# Patient Record
Sex: MALE | Race: OTHER | NOT HISPANIC OR LATINO | URBAN - METROPOLITAN AREA
[De-identification: names, ages, dates, MRNs, and addresses within clinical notes are randomized per-mention and may not be internally consistent; named-entity substitution may affect disease eponyms.]

---

## 2019-07-23 ENCOUNTER — EMERGENCY (EMERGENCY)
Facility: HOSPITAL | Age: 24
LOS: 1 days | Discharge: HOME | End: 2019-07-23
Attending: EMERGENCY MEDICINE | Admitting: EMERGENCY MEDICINE
Payer: COMMERCIAL

## 2019-07-23 VITALS
SYSTOLIC BLOOD PRESSURE: 130 MMHG | OXYGEN SATURATION: 100 % | RESPIRATION RATE: 17 BRPM | DIASTOLIC BLOOD PRESSURE: 84 MMHG | HEART RATE: 78 BPM

## 2019-07-23 VITALS
WEIGHT: 210.1 LBS | HEART RATE: 86 BPM | SYSTOLIC BLOOD PRESSURE: 133 MMHG | HEIGHT: 71 IN | TEMPERATURE: 98 F | RESPIRATION RATE: 16 BRPM | DIASTOLIC BLOOD PRESSURE: 87 MMHG | OXYGEN SATURATION: 98 %

## 2019-07-23 PROCEDURE — 99283 EMERGENCY DEPT VISIT LOW MDM: CPT

## 2019-07-23 PROCEDURE — 99053 MED SERV 10PM-8AM 24 HR FAC: CPT

## 2019-07-23 PROCEDURE — 73562 X-RAY EXAM OF KNEE 3: CPT | Mod: 26,LT

## 2019-07-23 PROCEDURE — 73562 X-RAY EXAM OF KNEE 3: CPT

## 2019-07-23 RX ORDER — IBUPROFEN 200 MG
800 TABLET ORAL ONCE
Refills: 0 | Status: COMPLETED | OUTPATIENT
Start: 2019-07-23 | End: 2019-07-23

## 2019-07-23 RX ORDER — IBUPROFEN 200 MG
1 TABLET ORAL
Qty: 40 | Refills: 0
Start: 2019-07-23 | End: 2019-08-01

## 2019-07-23 RX ADMIN — Medication 800 MILLIGRAM(S): at 02:47

## 2019-07-23 NOTE — ED PROCEDURE NOTE - CPROC ED POST PROC CARE GUIDE1
Keep the cast/splint/dressing clean and dry./Instructed patient/caregiver to follow-up with primary care physician./Verbal/written post procedure instructions were given to patient/caregiver./Elevate the injured extremity as instructed./Instructed patient/caregiver regarding signs and symptoms of infection.

## 2019-07-23 NOTE — ED PROVIDER NOTE - MUSCULOSKELETAL MINIMAL EXAM
L knee pain with flexion, no effusion palpated, nml extension, no laxity to varus or valgus stress or anterior or posterior drawer test/TORTICOLLIS

## 2019-07-23 NOTE — ED PROVIDER NOTE - NSFOLLOWUPCLINICS_GEN_ALL_ED_FT
[facility]  Orthopedics  [address] [place] [address]  Phone: [phone]  Fax: [phone]  Follow Up Time:

## 2019-07-23 NOTE — ED PROVIDER NOTE - CLINICAL SUMMARY MEDICAL DECISION MAKING FREE TEXT BOX
24 M with L knee pain s/p mechanical fall with direct blow to L knee x today. Xray shows no displaced fracture. Given ibuprofen for pain, place in knee immobilizer and crutches. Will have outpatient MRI scheduled by care coordinator Letty.

## 2019-07-23 NOTE — ED PROVIDER NOTE - CARE PROVIDER_API CALL
Bill Huffman (MD)  Orthopaedic Surgery; Sports Medicine  [address] [place] [address]  Phone: [phone]  Fax: [phone]  Follow Up Time:

## 2019-07-23 NOTE — ED PROVIDER NOTE - OBJECTIVE STATEMENT
25 y/o male with no significant PMHx presents to the ED c/o L knee pain x today. Pt notes he works as a  and while at work today at 12:30AM, pt sustained a trip and fall, landing on his L knee. Pt notes he felt and heard a pop upon impact. Pt denies numbness, tingling, or any other complaints. NKDA.

## 2019-07-23 NOTE — ED PROVIDER NOTE - NSFOLLOWUPINSTRUCTIONS_ED_ALL_ED_FT
Continue ibuprofen 800mg every 6 hours as needed for pain.  Keep knee in immobilizer and use crutches to walk.  Followup with orthopedist above for reevaluation-call office for appointment.  You will be called to schedule MRI of the knee later this week.

## 2019-07-23 NOTE — ED ADULT NURSE NOTE - OBJECTIVE STATEMENT
Pt was BIB EMS reporting fall at work resulting in left knee pain and difficulty ambulating or bending knee. AxO3. Pt denied head injury, SOB, chest pain, difficulty breathing, N/V/D, fever. All fall precautions in place.

## 2019-07-23 NOTE — ED ADULT NURSE NOTE - NSIMPLEMENTINTERV_GEN_ALL_ED
Implemented All Fall Risk Interventions:  [place] to call system. Call bell, personal items and telephone within reach. Instruct patient to call for assistance. Room bathroom lighting operational. Non-slip footwear when patient is off stretcher. Physically safe environment: no spills, clutter or unnecessary equipment. Stretcher in lowest position, wheels locked, appropriate side rails in place. Provide visual cue, wrist band, yellow gown, etc. Monitor gait and stability. Monitor for mental status changes and reorient to person, place, and time. Review medications for side effects contributing to fall risk. Reinforce activity limits and safety measures with patient and family.

## 2019-08-02 PROBLEM — Z78.9 OTHER SPECIFIED HEALTH STATUS: Chronic | Status: ACTIVE | Noted: 2019-07-23

## 2019-08-06 PROBLEM — Z00.00 ENCOUNTER FOR PREVENTIVE HEALTH EXAMINATION: Status: ACTIVE | Noted: 2019-08-06

## 2019-08-16 ENCOUNTER — TRANSCRIPTION ENCOUNTER (OUTPATIENT)
Age: 24
End: 2019-08-16

## 2019-08-16 ENCOUNTER — APPOINTMENT (OUTPATIENT)
Age: 24
End: 2019-08-16
Payer: OTHER MISCELLANEOUS

## 2019-08-16 VITALS — WEIGHT: 210 LBS | HEIGHT: 70 IN | BODY MASS INDEX: 30.06 KG/M2

## 2019-08-16 PROCEDURE — 73564 X-RAY EXAM KNEE 4 OR MORE: CPT | Mod: LT

## 2019-08-16 PROCEDURE — 99455 WORK RELATED DISABILITY EXAM: CPT

## 2019-08-16 RX ORDER — DICLOFENAC SODIUM 50 MG/1
50 TABLET, DELAYED RELEASE ORAL
Qty: 60 | Refills: 1 | Status: ACTIVE | COMMUNITY
Start: 2019-08-16 | End: 1900-01-01

## 2019-09-20 NOTE — HISTORY OF PRESENT ILLNESS
[de-identified] : CC Left knee\par \par HPI YULY, 24 year old M RHD presents with acute onset of 3 weeks (July 23, 2019) of Intermittent w Activity Related pain in  the Medial left knee with injury. Patient was at work, as a , and fell, twisting his left knee.  Patient did go to [facility] ([place]), x-rays were taken and told negative.  The pain is better and rated a 5 out of 10, described as Sharp, without radiation. Rest makes the pain better and walking standing stairs, any activity bending the left knee makes the pain worse. The patient reports associated symptoms of Pop/Swelling/Weakess/Instability. The patient Denies pain at night affecting sleep, and denies] similar pain previously.\par \par The patient has tried the following treatments:\par Activity modification	+\par Ice/Compression  	               +\par Braces    		               + (crutches too)\par Nsaids    		               +\par Physical Therapy  	               -\par Cortisone Injection	               -\par Visco Injection		-\par Arthroscopy		-\par \par Review of Systems is positive for the above musculoskeletal symptoms and is otherwise non-contributory for general, constitutional, psychiatric, neurologic, HEENT, cardiac, respiratory, gastrointestinal, reproductive, lymphatic, and dermatologic complaints.\par \par Consult by [facility].\par

## 2019-09-20 NOTE — DISCUSSION/SUMMARY
[de-identified] : Left knee internal derangement and traumatic patellofemoral syndrome\par \par \par I discussed my findings on history, exam and radiology.\par \par I reviewed the anatomy and function of the periarticular muscles and tendons, patella, ligaments, menisci and cartilage of the knee using models, images and diagrams. Given the current findings for the patient, I recommend proceeding with advanced imaging to better characterize and diagnose the problem to aid patient care, management and treatment guidance as I suspect an internal injury to the knee not diagnosed on non-diagnostic plain radiographs causing the patients symptoms and physical examination to help provide surgical management planning.\par \par Physical therapy\par \par Therefore given the patients continued symptoms despite conservative managements with continued pain affecting ADLs and inability to do activities limiting quality of life as well as locking and instability significant for patient falls potentially placing the patient at increased risk for exacerbating the injury or causing further injury to the knee, an examination and history consistent with injury to an internal knee derangement and a non-diagnostic radiograph I recommend obtaining an MRI to assess the knee's internal structures for preoperative planning. I discussed with the patient that I am ordering an MRI to assess the soft tissue structures in the joint such as ligaments and tendons, as well as to assess the cartilage and menisci as well as for any bony edema. The test will need insurance approval and will take place at a [facility] or outside facility. If the patient elects to obtain the MRI from an outside facility, the patient understands they have been instructed to bring in both the final radiologist read and a CD/DVD with the MRI images to allow review of the imaging test by myself during the follow up visit. I do not recommend obtaining an open standing MRI as the quality of the images is subpar and makes it difficult to diagnose intra-articular derangements and guide care discussion and decision making.\par The patient was prescribed Diclofenac PO non-steroidal anti-inflammatory medication. 50mg tablets twice daily to be taken for at least 1-2 weeks in a row and then PRN afterwards. Risks and benefits were discussed and include but not limited to renal damage and GI ulceration and bleeding.  They were advised to take with food to limit stomach upset as well as warned to stop the medication if worsening gastric pain or dizziness or other side effects. Also to immediately stop the medication and seek appropriate medical attention if any severe stomach ache, gastritis, black/red vomit, black/red stools or any other medical concern.\par \par The patient verifies their understanding the the visit, diagnosis and plan. They agree with the treatment plan and will contact the office with any questions or problems.\par \par FU after MRI is obtained

## 2019-09-20 NOTE — PHYSICAL EXAM
[Not Fit For Work] : Not fit for work [Percentage Of Disability ( ___ % )] : currently ~he/she~ is at [unfilled]% disability [de-identified] : Physical Examination\par General: well nourished, in no acute distress, alert and oriented to person, place and time\par Psychiatric: normal mood and affect, no abnormal movements or speech patterns\par Eyes: vision intact - glasses\par Throat: no thyromegaly\par Lymph: no enlarged nodes, no lymphedema in extremity\par Respiratory: no wheezing, no shortness of breath with ambulation\par Cardiac: no cardiac leg swelling, 2+ peripheral pulses\par Neurology: normal gross sensation in extremities to light touch\par Abdomen: soft, non-tender, tympanic, no masses\par \par Musculoskeletal Examination\par Ambulation	+ antalgic gait, - assistive devices\par \par Knee			Right			Left\par General\par      Swelling/Deformity	normal			normal	\par      Skin			normal			normal\par      Erythema		-			-\par      Standing Alignment	neutral			neutral\par      Effusion		none			trace\par Range of Motion\par      Hip			full painless ROM		full painless ROM\par      Knee Flexion		130			120+\par      Knee Extension	0			0\par Patella\par      J Sign		-			-\par      Quad Medial/Lateral	1/1 1/1\par      Apprehension		-			-\par      Dennis's		-			+\par      Grind Sign		-			+\par      Crepitus		-			+\par Palpation\par      Medial Joint Line	-			+\par      Medial Fem Condyle	-			-\par      Lateral Joint Line	-			-\par      Quad Tendon		-			-\par      Patella Tendon	-			-\par      Medial Patella		-			-\par      Lateral Patella 	-			-\par      Posterior Knee	-			-\par Ligamentous\par      Varus @ 0° / 30°	-/-			-/-\par      Valgus @ 0° / 30°	-/-			-/-\par      Lachman		-			-\par      Pivot Shift		-			-\par      Anterior Drawer	-			-\par      Posterior Drawer	-			-\par Meniscus\par      Samantha		-			+ medial pain\par      Flexion Pinch		-			+ medial pain\par Strength Examination/Atrophy\par      Hip Flexors 		5+			5+\par      Quadriceps		5+			5+\par      Hamstring		5+			5+\par      Tibialis Anterior	5+			5+\par      Achilles/Soleus	5+			5+\par Sensation\par      Deep Peroneal	normal			normal\par      Superficial Peroneal 	normal			normal\par      Sural  		normal			normal\par      Posterior Tibial 	normal			normal\par      Saphneous 		normal			normal\par Pulses\par      DP			2+			2+\par  [de-identified] : 5 views of the affected Left knee (standing AP, flexing standing AP, 30degree flexed lateral, 0degree lateral, sunrise view)\par were ordered, obtained and evaluated by myself today and\par demonstrate:\par There is no narrowing\par no osteophytic lipping\par no suprapatellar effusion\par no patellofemoral joint space loss without evidence of tilt [or] subluxation on sunrise view\par Normal soft tissue density\par Otherwise normal osseous bone structure without fracture or dislocation [Physical Disability Temporary Total] : temporarily total disabled [FreeTextEntry4] : pending MRI

## 2019-09-27 ENCOUNTER — APPOINTMENT (OUTPATIENT)
Dept: ORTHOPEDIC SURGERY | Facility: CLINIC | Age: 24
End: 2019-09-27
Payer: OTHER MISCELLANEOUS

## 2019-09-27 PROCEDURE — 99455 WORK RELATED DISABILITY EXAM: CPT

## 2019-09-27 NOTE — DISCUSSION/SUMMARY
[de-identified] : Left knee ACL rupture and lateral meniscus flipped bucket handle tear and traumatic patellofemoral syndrome\par \par I reviewed my findings on history, exam and radiology.\par \par I reviewed the anatomy and function of the anterior cruciate ligament with models, pictures and diagrams as well as the injury mechanism and expected symptoms post injury. I discussed the injury management both operative and non-operative including physical therapy, bracing, non-steroidal anti-inflammatories, activity modification, rest, ice, compression, activity modifaction and patient education. I reviewed the long term expectations and sequelae of the injury with both operative and non-operative management. The patient's current activity level and future expected activity level and lifestyle expectations were reviewed in relation to expected impairment with operative versus non-operative management.\par \par Given the above discussion the patient wishes to discuss options with .\par \par Will proceed with a course of PT to maintin ROM and strength in preparation if patient proceeds with surgery.\par \par The patient was prescribed Diclofenac PO non-steroidal anti-inflammatory medication. 50mg tablets twice daily to be taken for at least 1-2 weeks in a row and then PRN afterwards. Risks and benefits were discussed and include but not limited to renal damage and GI ulceration and bleeding.  They were advised to take with food to limit stomach upset as well as warned to stop the medication if worsening gastric pain or dizziness or other side effects. Also to immediately stop the medication and seek appriate medical attention if any severe stomach ache, gastritis, black/red vomit, black/red stools or any other medical concern.\par \par ice and elevate\par \par Compressive ace wrap on leg\par \par NWB LLE w crutches\par \par patient is total temorary disabled due to work place injury\par \par The patient verifies their understanding the the visit, diagnosis and plan. They agree with the treatment plan and will contact the office with any questions or problems.\par \par FU 2-3 weeks

## 2019-09-27 NOTE — HISTORY OF PRESENT ILLNESS
[de-identified] : CC Left knee\par \par RENATA EMERY, 24 year old M RHD presents for mri review of acute onset of 3 weeks (July 23, 2019) of Intermittent w Activity Related pain in the Medial left knee with injury. Patient was at work, as a , and fell, twisting his left knee. Patient did go to [facility] ([place]), x-rays were taken and told negative. The pain is better and rated a 5 out of 10, described as Sharp, without radiation. Rest makes the pain better and walking standing stairs, any activity bending the left knee makes the pain worse. The patient reports associated symptoms of Pop/Swelling/Weakess/Instability. The patient Denies pain at night affecting sleep, and denies] similar pain previously.\par \par The patient has tried the following treatments:\par Activity modification	+\par Ice/Compression 	 +\par Braces 		 + (crutches too)\par Nsaids 		 +\par Physical Therapy 	 -\par Cortisone Injection	 -\par Visco Injection		-\par Arthroscopy		-\par \par Review of Systems is positive for the above musculoskeletal symptoms and is otherwise non-contributory for general, constitutional, psychiatric, neurologic, HEENT, cardiac, respiratory, gastrointestinal, reproductive, lymphatic, and dermatologic complaints.\par \par Consult by [facility].\par

## 2019-09-27 NOTE — PHYSICAL EXAM
[de-identified] : Physical Examination\par General: well nourished, in no acute distress, alert and oriented to person, place and time\par Psychiatric: normal mood and affect, no abnormal movements or speech patterns\par Eyes: vision intact - glasses\par Throat: no thyromegaly\par Lymph: no enlarged nodes, no lymphedema in extremity\par Respiratory: no wheezing, no shortness of breath with ambulation\par Cardiac: no cardiac leg swelling, 2+ peripheral pulses\par Neurology: normal gross sensation in extremities to light touch\par Abdomen: soft, non-tender, tympanic, no masses\par \par Musculoskeletal Examination\par Ambulation	+ antalgic gait, - assistive devices\par \par Knee			Right			Left\par General\par      Swelling/Deformity	normal			normal	\par      Skin			normal			normal\par      Erythema		-			-\par      Standing Alignment	neutral			neutral\par      Effusion		none			trace\par Range of Motion\par      Hip			full painless ROM		full painless ROM\par      Knee Flexion		130			120+\par      Knee Extension	0			0\par Patella\par      J Sign		-			-\par      Quad Medial/Lateral	1/1 1/1\par      Apprehension		-			-\par      Dennis's		-			+\par      Grind Sign		-			+\par      Crepitus		-			+\par Palpation\par      Medial Joint Line	-			+\par      Medial Fem Condyle	-			-\par      Lateral Joint Line	-			-\par      Quad Tendon		-			-\par      Patella Tendon	-			-\par      Medial Patella		-			-\par      Lateral Patella 	-			-\par      Posterior Knee	-			-\par Ligamentous\par      Varus @ 0° / 30°	-/-			-/-\par      Valgus @ 0° / 30°	-/-			-/-\par      Lachman		-			2B\par      Pivot Shift		-			limited exam\par      Anterior Drawer	-			-\par      Posterior Drawer	-			-\par Meniscus\par      Samantha		-			+ pain\par      Flexion Pinch		-			+ pain\par Strength Examination/Atrophy\par      Hip Flexors 		5+			5+\par      Quadriceps		5+			5+\par      Hamstring		5+			5+\par      Tibialis Anterior	5+			5+\par      Achilles/Soleus	5+			5+\par Sensation\par      Deep Peroneal	normal			normal\par      Superficial Peroneal 	normal			normal\par      Sural  		normal			normal\par      Posterior Tibial 	normal			normal\par      Saphneous 		normal			normal\par Pulses\par      DP			2+			2+\par  [de-identified] : 5 views of the affected Left knee (standing AP, flexing standing AP, 30degree flexed lateral, 0degree lateral, sunrise view)\par 8-16-19\par demonstrate:\par There is no narrowing\par no osteophytic lipping\par no suprapatellar effusion\par no patellofemoral joint space loss without evidence of tilt [or] subluxation on sunrise view\par Normal soft tissue density\par Otherwise normal osseous bone structure without fracture or dislocation\par \par \par MRI Left knee from [facility] on 9-13-19\par My impression of the images:\par Quality of the MRI is ok\par Medial Meniscus ok\par Lateral Meniscus possible flipped bucket handle tear\par There is mild chondral loss in the patellofemoral compartments\par There is bone marrow edema in the lateral compartments bone bruise acl pattern\par LCL is intact\par MCL is intact\par ACL is not intact  torn\par PCL is intact \par Quadriceps Tendon  is  intact\par Patella Tendon is intact\par \par The Final Radiologist Impression:\par Joint effusion.\par Lateral patellar tilt, without subluxation. Vaguely defined marrow edema posterior margins medial and lateral tibial plateau, as well as along the anterior margin of the medial tibial plateau. No visualized fracture line. No marrow infiltrative process.\par Full-thickness tear anterior cruciate ligament, with abnormal horizontal orientation. Distal quadriceps tendon, patellar tendon, PCL, popliteus, medial collateral ligament, lateral collateral ligament, iliotibial band and biceps femoris intact.\par No evidence of medial meniscal tear. Truncation lateral meniscal body with a question of a displaced bucket-handle fragment in the intercondylar region versus a prominent meniscal-meniscal ligament.\par No soft tissue mass.\par IMPRESSION:\par 1. Full-thickness tear anterior cruciate ligament.\par 2. Lateral meniscal tear with question of nondisplaced intercondylar bucket handle fragment, as above.\par 3. Residual bone contusions.\par 4. Joint effusion.\par  [Percentage Of Disability ( ___ % )] : currently ~he/she~ is at [unfilled]% disability [Not Fit For Work] : Not fit for work [Physical Disability Temporary Total] : temporarily total disabled

## 2019-10-11 ENCOUNTER — APPOINTMENT (OUTPATIENT)
Dept: ORTHOPEDIC SURGERY | Facility: CLINIC | Age: 24
End: 2019-10-11
Payer: OTHER MISCELLANEOUS

## 2019-10-11 PROCEDURE — 99214 OFFICE O/P EST MOD 30 MIN: CPT

## 2019-10-11 NOTE — DISCUSSION/SUMMARY
[de-identified] : Left knee ACL rupture and lateral meniscus flipped bucket handle tear and traumatic patellofemoral syndrome\par \par I reviewed my findings on history, exam and radiology.\par \par I reviewed the anatomy and function of the anterior cruciate ligament with models, pictures and diagrams as well as the injury mechanism and expected symptoms post injury. I discussed the injury management both operative and non-operative including physical therapy, bracing, non-steroidal anti-inflammatories, activity modification, rest, ice, compression, activity modifaction and patient education. I reviewed the long term expectations and sequelae of the injury with both operative and non-operative management. The patient's current activity level and future expected activity level and lifestyle expectations were reviewed in relation to expected impairment with operative versus non-operative management.\par \par \par Will continue PT to maintain ROM and strength in preparation\par \par patient wishes to proceed to surgery. will get work comp authorization for ACL recon and lateral meniscus repair vs debridementr\par \par continue prescribed Diclofenac PO non-steroidal anti-inflammatory medication. 50mg tablets twice daily to be taken for at least 1-2 weeks in a row and then PRN afterwards. Risks and benefits were discussed and include but not limited to renal damage and GI ulceration and bleeding.  They were advised to take with food to limit stomach upset as well as warned to stop the medication if worsening gastric pain or dizziness or other side effects. Also to immediately stop the medication and seek appriate medical attention if any severe stomach ache, gastritis, black/red vomit, black/red stools or any other medical concern.\par \par ice and elevate\par \par Compressive ace wrap on leg\par \par NWB LLE w crutches\par \par patient is total temporary disabled due to work place injury\par \par The patient verifies their understanding the the visit, diagnosis and plan. They agree with the treatment plan and will contact the office with any questions or problems.\par \par FU 4-6 weeks if auth still pending

## 2019-10-11 NOTE — PHYSICAL EXAM
[de-identified] : Physical Examination\par General: well nourished, in no acute distress, alert and oriented to person, place and time\par Psychiatric: normal mood and affect, no abnormal movements or speech patterns\par Eyes: vision intact - glasses\par Throat: no thyromegaly\par Lymph: no enlarged nodes, no lymphedema in extremity\par Respiratory: no wheezing, no shortness of breath with ambulation\par Cardiac: no cardiac leg swelling, 2+ peripheral pulses\par Neurology: normal gross sensation in extremities to light touch\par Abdomen: soft, non-tender, tympanic, no masses\par \par Musculoskeletal Examination\par Ambulation	+ antalgic gait, - assistive devices\par \par Knee			Right			Left\par General\par      Swelling/Deformity	normal			normal	\par      Skin			normal			normal\par      Erythema		-			-\par      Standing Alignment	neutral			neutral\par      Effusion		none			trace\par Range of Motion\par      Hip			full painless ROM		full painless ROM\par      Knee Flexion		130			130\par      Knee Extension	0			0\par Patella\par      J Sign		-			-\par      Quad Medial/Lateral	1/1 1/1\par      Apprehension		-			-\par      Dennis's		-			+\par      Grind Sign		-			+\par      Crepitus		-			+\par Palpation\par      Medial Joint Line	-			-\par      Medial Fem Condyle	-			-\par      Lateral Joint Line	-			+\par      Quad Tendon		-			-\par      Patella Tendon	-			-\par      Medial Patella		-			-\par      Lateral Patella 	-			-\par      Posterior Knee	-			-\par Ligamentous\par      Varus @ 0° / 30°	-/-			-/-\par      Valgus @ 0° / 30°	-/-			-/-\par      Lachman		-			2B\par      Pivot Shift		-			limited exam\par      Anterior Drawer	-			-\par      Posterior Drawer	-			-\par Meniscus\par      Samantha		-			+ pain\par      Flexion Pinch		-			+ pain\par Strength Examination/Atrophy\par      Hip Flexors 		5+			5+\par      Quadriceps		5+			5+\par      Hamstring		5+			5+\par      Tibialis Anterior	5+			5+\par      Achilles/Soleus	5+			5+\par Sensation\par      Deep Peroneal	normal			normal\par      Superficial Peroneal 	normal			normal\par      Sural  		normal			normal\par      Posterior Tibial 	normal			normal\par      Saphneous 		normal			normal\par Pulses\par      DP			2+			2+\par  [de-identified] : 5 views of the affected Left knee (standing AP, flexing standing AP, 30degree flexed lateral, 0degree lateral, sunrise view)\par 8-16-19\par demonstrate:\par There is no narrowing\par no osteophytic lipping\par no suprapatellar effusion\par no patellofemoral joint space loss without evidence of tilt [or] subluxation on sunrise view\par Normal soft tissue density\par Otherwise normal osseous bone structure without fracture or dislocation\par \par \par MRI Left knee from [facility] on 9-13-19\par My impression of the images:\par Quality of the MRI is ok\par Medial Meniscus ok\par Lateral Meniscus possible flipped bucket handle tear\par There is mild chondral loss in the patellofemoral compartments\par There is bone marrow edema in the lateral compartments bone bruise acl pattern\par LCL is intact\par MCL is intact\par ACL is not intact  torn\par PCL is intact \par Quadriceps Tendon  is  intact\par Patella Tendon is intact\par \par The Final Radiologist Impression:\par Joint effusion.\par Lateral patellar tilt, without subluxation. Vaguely defined marrow edema posterior margins medial and lateral tibial plateau, as well as along the anterior margin of the medial tibial plateau. No visualized fracture line. No marrow infiltrative process.\par Full-thickness tear anterior cruciate ligament, with abnormal horizontal orientation. Distal quadriceps tendon, patellar tendon, PCL, popliteus, medial collateral ligament, lateral collateral ligament, iliotibial band and biceps femoris intact.\par No evidence of medial meniscal tear. Truncation lateral meniscal body with a question of a displaced bucket-handle fragment in the intercondylar region versus a prominent meniscal-meniscal ligament.\par No soft tissue mass.\par \par IMPRESSION:\par 1. Full-thickness tear anterior cruciate ligament.\par 2. Lateral meniscal tear with question of nondisplaced intercondylar bucket handle fragment, as above.\par 3. Residual bone contusions.\par 4. Joint effusion.\par  [Not Fit For Work] : Not fit for work [Percentage Of Disability ( ___ % )] : currently ~he/she~ is at [unfilled]% disability [Physical Disability Temporary Total] : temporarily total disabled

## 2019-10-11 NOTE — HISTORY OF PRESENT ILLNESS
[de-identified] : CC Left knee\par \par HPI YULY, 24 year old M RHD presents for FU discussion of MRI review of acute onset of 3 weeks (July 23, 2019) of Intermittent w Activity Related pain in the Medial left knee with injury. Patient was at work, as a , and fell, twisting his left knee. Patient did go to [facility] ([place]), x-rays were taken and told negative. The pain is better and rated a 5 out of 10, described as Sharp, without radiation. Rest makes the pain better and walking standing stairs, any activity bending the left knee makes the pain worse. The patient reports associated symptoms of Pop/Swelling/Weakess/Instability. The patient Denies pain at night affecting sleep, and denies] similar pain previously.\par \par The patient has tried the following treatments:\par Activity modification	+\par Ice/Compression 	 +\par Braces 		 + (crutches too)\par Nsaids 		 +\par Physical Therapy 	 +\par Cortisone Injection	 -\par Visco Injection		-\par Arthroscopy		-\par \par pain improving w PT but still feels unstable\par \par Review of Systems is positive for the above musculoskeletal symptoms and is otherwise non-contributory for general, constitutional, psychiatric, neurologic, HEENT, cardiac, respiratory, gastrointestinal, reproductive, lymphatic, and dermatologic complaints.\par \par Consult by [facility].\par

## 2019-12-24 ENCOUNTER — OUTPATIENT (OUTPATIENT)
Dept: OUTPATIENT SERVICES | Facility: HOSPITAL | Age: 24
LOS: 1 days | End: 2019-12-24
Payer: COMMERCIAL

## 2019-12-24 VITALS
TEMPERATURE: 99 F | WEIGHT: 214.95 LBS | SYSTOLIC BLOOD PRESSURE: 122 MMHG | DIASTOLIC BLOOD PRESSURE: 85 MMHG | HEART RATE: 85 BPM | OXYGEN SATURATION: 99 % | RESPIRATION RATE: 16 BRPM | HEIGHT: 71 IN

## 2019-12-24 DIAGNOSIS — Z01.818 ENCOUNTER FOR OTHER PREPROCEDURAL EXAMINATION: ICD-10-CM

## 2019-12-24 DIAGNOSIS — S83.512A SPRAIN OF ANTERIOR CRUCIATE LIGAMENT OF LEFT KNEE, INITIAL ENCOUNTER: ICD-10-CM

## 2019-12-24 DIAGNOSIS — S83.252A BUCKET-HANDLE TEAR OF LATERAL MENISCUS, CURRENT INJURY, LEFT KNEE, INITIAL ENCOUNTER: ICD-10-CM

## 2019-12-24 LAB
ANION GAP SERPL CALC-SCNC: 8 MMOL/L — SIGNIFICANT CHANGE UP (ref 5–17)
BUN SERPL-MCNC: 15 MG/DL — SIGNIFICANT CHANGE UP (ref 7–18)
CALCIUM SERPL-MCNC: 9.5 MG/DL — SIGNIFICANT CHANGE UP (ref 8.4–10.5)
CHLORIDE SERPL-SCNC: 105 MMOL/L — SIGNIFICANT CHANGE UP (ref 96–108)
CO2 SERPL-SCNC: 27 MMOL/L — SIGNIFICANT CHANGE UP (ref 22–31)
CREAT SERPL-MCNC: 0.97 MG/DL — SIGNIFICANT CHANGE UP (ref 0.5–1.3)
GLUCOSE SERPL-MCNC: 111 MG/DL — HIGH (ref 70–99)
HCT VFR BLD CALC: 47.4 % — SIGNIFICANT CHANGE UP (ref 39–50)
HGB BLD-MCNC: 15.3 G/DL — SIGNIFICANT CHANGE UP (ref 13–17)
MCHC RBC-ENTMCNC: 27.6 PG — SIGNIFICANT CHANGE UP (ref 27–34)
MCHC RBC-ENTMCNC: 32.3 GM/DL — SIGNIFICANT CHANGE UP (ref 32–36)
MCV RBC AUTO: 85.4 FL — SIGNIFICANT CHANGE UP (ref 80–100)
NRBC # BLD: 0 /100 WBCS — SIGNIFICANT CHANGE UP (ref 0–0)
PLATELET # BLD AUTO: 268 K/UL — SIGNIFICANT CHANGE UP (ref 150–400)
POTASSIUM SERPL-MCNC: 4.1 MMOL/L — SIGNIFICANT CHANGE UP (ref 3.5–5.3)
POTASSIUM SERPL-SCNC: 4.1 MMOL/L — SIGNIFICANT CHANGE UP (ref 3.5–5.3)
RBC # BLD: 5.55 M/UL — SIGNIFICANT CHANGE UP (ref 4.2–5.8)
RBC # FLD: 12.1 % — SIGNIFICANT CHANGE UP (ref 10.3–14.5)
SODIUM SERPL-SCNC: 140 MMOL/L — SIGNIFICANT CHANGE UP (ref 135–145)
WBC # BLD: 6.11 K/UL — SIGNIFICANT CHANGE UP (ref 3.8–10.5)
WBC # FLD AUTO: 6.11 K/UL — SIGNIFICANT CHANGE UP (ref 3.8–10.5)

## 2019-12-24 PROCEDURE — 80048 BASIC METABOLIC PNL TOTAL CA: CPT

## 2019-12-24 PROCEDURE — 36415 COLL VENOUS BLD VENIPUNCTURE: CPT

## 2019-12-24 PROCEDURE — 85027 COMPLETE CBC AUTOMATED: CPT

## 2019-12-24 PROCEDURE — G0463: CPT

## 2019-12-24 RX ORDER — SODIUM CHLORIDE 9 MG/ML
3 INJECTION INTRAMUSCULAR; INTRAVENOUS; SUBCUTANEOUS EVERY 8 HOURS
Refills: 0 | Status: DISCONTINUED | OUTPATIENT
Start: 2020-01-02 | End: 2020-01-11

## 2019-12-24 NOTE — H&P PST ADULT - ASSESSMENT
25 yo male is scheduled for :  Left Knee Arthroscopy  Anterior Cruciate Ligament Reconstruction with Hamstring Autograft  Lateral Meniscus Repair  Possible Debridement, on 1/2/20

## 2019-12-24 NOTE — H&P PST ADULT - HISTORY OF PRESENT ILLNESS
25 yo male with no significant PMHx, reports the above. He is scheduled for Left Knee Arthroscopy  Anterior Cruciate Ligament Reconstruction with Hamstring Autograft  Lateral Meniscus Repair  Possible Debridement, on 1/2/20

## 2019-12-24 NOTE — H&P PST ADULT - NEGATIVE ALLERGY TYPES
no reactions to animals/no reactions to insect bites/no reactions to medicines/no reactions to food/no outdoor environmental allergies/no indoor environmental allergies

## 2019-12-24 NOTE — H&P PST ADULT - REASON FOR ADMISSION
Injured my left knee, at work in July/2019. Has not been able to stand on feet for more th20-30 minutes

## 2019-12-24 NOTE — H&P PST ADULT - NSICDXPROBLEM_GEN_ALL_CORE_FT
PROBLEM DIAGNOSES  Problem: Bucket-handle tear of lateral meniscus, current injury, left knee, initial encounter  Assessment and Plan: Left Knee Arthroscopy  Anterior Cruciate Ligament Reconstruction with Hamstring Autograft  Lateral Meniscus Repair  Possible Debridement    Problem: Sprain of anterior cruciate ligament of left knee, initial encounter  Assessment and Plan: Left Knee Arthroscopy  Anterior Cruciate Ligament Reconstruction with Hamstring Autograft  Lateral Meniscus Repair  Possible Debridement

## 2019-12-24 NOTE — H&P PST ADULT - NSANTHOSAYNRD_GEN_A_CORE
No. MARITZA screening performed.  STOP BANG Legend: 0-2 = LOW Risk; 3-4 = INTERMEDIATE Risk; 5-8 = HIGH Risk

## 2020-01-01 ENCOUNTER — TRANSCRIPTION ENCOUNTER (OUTPATIENT)
Age: 25
End: 2020-01-01

## 2020-01-02 ENCOUNTER — OUTPATIENT (OUTPATIENT)
Dept: OUTPATIENT SERVICES | Facility: HOSPITAL | Age: 25
LOS: 1 days | End: 2020-01-02
Payer: COMMERCIAL

## 2020-01-02 ENCOUNTER — APPOINTMENT (OUTPATIENT)
Dept: ORTHOPEDIC SURGERY | Facility: HOSPITAL | Age: 25
End: 2020-01-02

## 2020-01-02 VITALS
TEMPERATURE: 98 F | SYSTOLIC BLOOD PRESSURE: 124 MMHG | DIASTOLIC BLOOD PRESSURE: 84 MMHG | HEART RATE: 73 BPM | RESPIRATION RATE: 18 BRPM | OXYGEN SATURATION: 100 %

## 2020-01-02 VITALS
WEIGHT: 214.95 LBS | OXYGEN SATURATION: 99 % | TEMPERATURE: 102 F | RESPIRATION RATE: 16 BRPM | SYSTOLIC BLOOD PRESSURE: 124 MMHG | HEART RATE: 124 BPM | HEIGHT: 71 IN | DIASTOLIC BLOOD PRESSURE: 88 MMHG

## 2020-01-02 DIAGNOSIS — S83.512A SPRAIN OF ANTERIOR CRUCIATE LIGAMENT OF LEFT KNEE, INITIAL ENCOUNTER: ICD-10-CM

## 2020-01-02 DIAGNOSIS — S83.252A BUCKET-HANDLE TEAR OF LATERAL MENISCUS, CURRENT INJURY, LEFT KNEE, INITIAL ENCOUNTER: ICD-10-CM

## 2020-01-02 PROCEDURE — C1889: CPT

## 2020-01-02 PROCEDURE — 29888 ARTHRS AID ACL RPR/AGMNTJ: CPT | Mod: LT

## 2020-01-02 PROCEDURE — C1713: CPT

## 2020-01-02 PROCEDURE — 29882 ARTHRS KNE SRG MNISC RPR M/L: CPT | Mod: LT

## 2020-01-02 PROCEDURE — C1769: CPT

## 2020-01-02 RX ORDER — ONDANSETRON 8 MG/1
4 TABLET, FILM COATED ORAL EVERY 6 HOURS
Refills: 0 | Status: DISCONTINUED | OUTPATIENT
Start: 2020-01-02 | End: 2020-01-11

## 2020-01-02 RX ORDER — CEPHALEXIN 500 MG
1 CAPSULE ORAL
Qty: 9 | Refills: 0
Start: 2020-01-02 | End: 2020-01-04

## 2020-01-02 RX ORDER — OXYCODONE AND ACETAMINOPHEN 5; 325 MG/1; MG/1
1 TABLET ORAL EVERY 4 HOURS
Refills: 0 | Status: DISCONTINUED | OUTPATIENT
Start: 2020-01-02 | End: 2020-01-02

## 2020-01-02 RX ORDER — MORPHINE SULFATE 50 MG/1
4 CAPSULE, EXTENDED RELEASE ORAL
Refills: 0 | Status: DISCONTINUED | OUTPATIENT
Start: 2020-01-02 | End: 2020-01-02

## 2020-01-02 RX ORDER — CELECOXIB 200 MG/1
200 CAPSULE ORAL ONCE
Refills: 0 | Status: COMPLETED | OUTPATIENT
Start: 2020-01-02 | End: 2020-01-02

## 2020-01-02 RX ORDER — ASPIRIN/CALCIUM CARB/MAGNESIUM 324 MG
1 TABLET ORAL
Qty: 30 | Refills: 0
Start: 2020-01-02 | End: 2020-01-31

## 2020-01-02 RX ORDER — ACETAMINOPHEN 500 MG
1000 TABLET ORAL ONCE
Refills: 0 | Status: DISCONTINUED | OUTPATIENT
Start: 2020-01-02 | End: 2020-01-02

## 2020-01-02 RX ORDER — OXYCODONE AND ACETAMINOPHEN 5; 325 MG/1; MG/1
2 TABLET ORAL EVERY 6 HOURS
Refills: 0 | Status: DISCONTINUED | OUTPATIENT
Start: 2020-01-02 | End: 2020-01-02

## 2020-01-02 RX ORDER — ACETAMINOPHEN 500 MG
975 TABLET ORAL ONCE
Refills: 0 | Status: COMPLETED | OUTPATIENT
Start: 2020-01-02 | End: 2020-01-02

## 2020-01-02 RX ORDER — SODIUM CHLORIDE 9 MG/ML
1000 INJECTION, SOLUTION INTRAVENOUS
Refills: 0 | Status: DISCONTINUED | OUTPATIENT
Start: 2020-01-02 | End: 2020-01-02

## 2020-01-02 RX ADMIN — Medication 975 MILLIGRAM(S): at 08:50

## 2020-01-02 RX ADMIN — CELECOXIB 200 MILLIGRAM(S): 200 CAPSULE ORAL at 08:51

## 2020-01-02 RX ADMIN — SODIUM CHLORIDE 3 MILLILITER(S): 9 INJECTION INTRAMUSCULAR; INTRAVENOUS; SUBCUTANEOUS at 08:54

## 2020-01-02 NOTE — ASU DISCHARGE PLAN (ADULT/PEDIATRIC) - CARE PROVIDER_API CALL
Thee Whiteside)  Orthopaedic Surgery  [address] [place] [address], [address] [place] [address]  Phone: [phone]  Fax: [phone]  Follow Up Time:

## 2020-01-02 NOTE — ASU DISCHARGE PLAN (ADULT/PEDIATRIC) - ASU DC SPECIAL INSTRUCTIONSFT
Keep wound/bandage clean, dry and intact. Return to ER if Fever of 101 F or greater develops   Keep bledsoce/ACL brace on at all times  Keep brace clean and dry and intact LOCKED in full extension at all times    NON-weight bearing Left knee  Follow up with Dr Whiteside in TWO WEEK at [phone]

## 2020-01-02 NOTE — ASU PATIENT PROFILE, ADULT - VISION (WITH CORRECTIVE LENSES IF THE PATIENT USUALLY WEARS THEM):
Normal vision: sees adequately in most situations; can see medication labels, newsprint Involuntary Intramuscular (indication, name, dose, time)

## 2020-01-02 NOTE — BRIEF OPERATIVE NOTE - NSICDXBRIEFPREOP_GEN_ALL_CORE_FT
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

## 2020-01-02 NOTE — ASU DISCHARGE PLAN (ADULT/PEDIATRIC) - CALL YOUR DOCTOR IF YOU HAVE ANY OF THE FOLLOWING:
Wound/Surgical Site with redness, or foul smelling discharge or pus/Nausea and vomiting that does not stop/Unable to urinate/Bleeding that does not stop/Pain not relieved by Medications/Fever greater than (need to indicate Fahrenheit or Celsius)/Numbness, tingling, color or temperature change to extremity/Swelling that gets worse

## 2020-01-02 NOTE — BRIEF OPERATIVE NOTE - NSICDXBRIEFPOSTOP_GEN_ALL_CORE_FT
POST-OP DIAGNOSIS:  Left ACL tear 02-Jan-2020 09:16:59  Thee Whiteside  Complex tear of lateral meniscus of left knee 02-Jan-2020 09:16:51  Thee Whiteside

## 2020-01-02 NOTE — BRIEF OPERATIVE NOTE - NSICDXBRIEFPROCEDURE_GEN_ALL_CORE_FT
PROCEDURES:  Lateral meniscectomy of left knee 02-Jan-2020 09:12:52  Thee Whiteside  ACL reconstruction 02-Jan-2020 09:12:44  Thee Whiteside

## 2020-01-13 ENCOUNTER — APPOINTMENT (OUTPATIENT)
Dept: ORTHOPEDIC SURGERY | Facility: CLINIC | Age: 25
End: 2020-01-13
Payer: OTHER MISCELLANEOUS

## 2020-01-13 PROCEDURE — 99024 POSTOP FOLLOW-UP VISIT: CPT

## 2020-01-13 RX ORDER — DICLOFENAC SODIUM 50 MG/1
50 TABLET, DELAYED RELEASE ORAL
Qty: 60 | Refills: 1 | Status: ACTIVE | COMMUNITY
Start: 2020-01-13 | End: 1900-01-01

## 2020-01-13 NOTE — HISTORY OF PRESENT ILLNESS
[de-identified] : Patient is status post Left knee Arthroscopy on 1-2-2020\par ACL reconstruction with hamstring autograft with allograft augmentation\par Lateral meniscus debridement and repair with outside in and all inside repair\par \par The patient is doing well with improved pain postoperatively, is not taking narcotics for pain.\par They have been doing postoperative icing, elevation, compressive dressing and exercises as directed with improving swelling, pain. hasn't bent knee or placed weight.\par is having swelling lateral thigh from overly tight brace\par They are taking ASA as directed for postoperative DVT ppx\par \par The patient denies shortness of breath, chest pain, numbness tingling, worsening calf pain or swelling.\par \par Left Lower Ext\par \par Dressing intact\par Steri-Strips intact\par Incisions are intact\par There is no erythema induration warmth or tenderness about the incisions\par there is hyperemia about the knee\par There is mild effusion\par Knee ROM is from [ 0 - 45 painless ]\par Motor is intact knee/ankle/toe flexor/extensor\par Sensory intact deep+superficial peroneal/posterior tibial/sural/saphenous, mild numbness in distribution of infrapatellar brach saphenous\par Palpable DP with brisk capillary refill\par Mild quadriceps muscle weakness and decreased tone\par barely able to maintain SLR\par \par \par Assessment Plan\par 1.5 week status post Left knee Arthroscopy on 1-2-2020\par ACL reconstruction with hamstring autograft with allograft augmentation\par Lateral meniscus debridement and repair with outside in and all inside repair\par \par Steristrips removed and changed sterile\par \par Continue posteroperative exercises\par Continue compressive dressing and ice for pain and swelling\par \par progress WBAT LLE at 2 weeks\par \par remove brace at 2 weeks\par \par Begin Physical Therapy with Prescription and Protocol Provided\par Return to activities gradually as tolerated\par \par Continue shower, bathing w submersion of incision ok at 2 weeks\par \par Continue ASA DVT ppx for 1 month\par \par Surgery and arthroscopic images reviewed and provided for patient\par \par Follow up:\par 2 weeks

## 2020-01-13 NOTE — PHYSICAL EXAM
[Treating Physician] : treating physician [Not Fit For Work] : Not fit for work [Physical Disability Temporary Total] : temporarily total disabled

## 2020-01-27 ENCOUNTER — APPOINTMENT (OUTPATIENT)
Dept: ORTHOPEDIC SURGERY | Facility: CLINIC | Age: 25
End: 2020-01-27

## 2020-02-10 ENCOUNTER — APPOINTMENT (OUTPATIENT)
Dept: ORTHOPEDIC SURGERY | Facility: CLINIC | Age: 25
End: 2020-02-10
Payer: OTHER MISCELLANEOUS

## 2020-02-10 PROCEDURE — 99024 POSTOP FOLLOW-UP VISIT: CPT

## 2020-02-10 NOTE — HISTORY OF PRESENT ILLNESS
[de-identified] : Patient is status post Left knee Arthroscopy on 1-2-2020\par ACL reconstruction with hamstring autograft with allograft augmentation\par Lateral meniscus debridement and repair with outside in and all inside repair\par \par The patient is doing well with minimal pain. the operative knee is feeling better than the contralateral knee\par \par In PT now\par \par The patient denies shortness of breath, chest pain, numbness tingling, worsening calf pain or swelling.\par \par Left Lower Ext\par \par Incisions are intact, small monocryl stitch at distal tibia incision\par There is no erythema induration warmth or tenderness about the incisions\par there is resolved hyperemia about the knee\par There is trace effusion\par Knee ROM is from [ 0 - 110 painless ]\par Motor is intact knee/ankle/toe flexor/extensor\par Sensory intact deep+superficial peroneal/posterior tibial/sural/saphenous, improved numbness in distribution of infrapatellar brach saphenous\par Palpable DP with brisk capillary refill\par Mild quadriceps muscle weakness and decreased tone\par barely able to maintain SLR\par \par \par Assessment Plan\par 5 week status post Left knee Arthroscopy on 1-2-2020\par ACL reconstruction with hamstring autograft with allograft augmentation\par Lateral meniscus debridement and repair with outside in and all inside repair\par \par monocryl stitch trimmed\par \par Continue w Physical Therapy with Prescription and Protocol Provided\par Return to activities gradually as tolerated\par \par WBAT\par \par Surgery and arthroscopic images reviewed and provided for patient\par \par Follow UP\par  5-6weeks

## 2020-02-10 NOTE — PHYSICAL EXAM
[Treating Physician] : treating physician [Percentage Of Disability ( ___ % )] : currently ~he/she~ is at [unfilled]% disability [Not Fit For Work] : Not fit for work

## 2020-03-06 NOTE — H&P PST ADULT - PRO ARRIVE FROM
[FreeTextEntry1] : A/P\par GERD\par Today's instructions for acid reflux include avoid provocative foods. For example citrus alcohol coffee chocolate mints. Smaller meals, no eating 3 hours prior to bedtime and elevate head of the bed prior to sleep.\par  - Mildly irregular Z line\par  call in one week for biopsy of stomach  and esophagus to r/o Hp and Rey's esophagus\par F/U in 3 months\par Prilosec 40 mg qd\par  home

## 2020-03-13 ENCOUNTER — APPOINTMENT (OUTPATIENT)
Dept: ORTHOPEDIC SURGERY | Facility: CLINIC | Age: 25
End: 2020-03-13
Payer: OTHER MISCELLANEOUS

## 2020-03-13 PROCEDURE — 99024 POSTOP FOLLOW-UP VISIT: CPT

## 2020-03-13 NOTE — HISTORY OF PRESENT ILLNESS
[de-identified] : Patient is status post Left knee Arthroscopy on 1-2-2020\par ACL reconstruction with hamstring autograft with allograft augmentation\par Lateral meniscus debridement and repair with outside in and all inside repair\par \par The patient is doing well with minimal pain. the operative knee is feeling better than the contralateral knee\par \par In PT now, feeling much better, improved rom, strength and stability. pinch near tibial inciison at fiull extension\par \par The patient denies shortness of breath, chest pain, numbness tingling, worsening calf pain or swelling.\par \par Left Lower Ext\par \par Incisions are intact, well healed\par There is no erythema induration warmth or tenderness about the incisions\par there is resolved hyperemia about the knee\par There is no effusion\par Knee ROM is from [ 0 - 130 painless ]\par Motor is intact knee/ankle/toe flexor/extensor\par Sensory intact deep+superficial peroneal/posterior tibial/sural/saphenous, improved numbness in distribution of infrapatellar brach saphenous\par Palpable DP with brisk capillary refill\par Mild quadriceps muscle weakness and decreased tone\par barely able to maintain SLR\par \par \par Assessment Plan\par 10 week status post Left knee Arthroscopy on 1-2-2020\par ACL reconstruction with hamstring autograft with allograft augmentation\par Lateral meniscus debridement and repair with outside in and all inside repair\par \par Continue w Physical Therapy with Prescription and Protocol Provided\par Return to activities gradually as tolerated\par \par WBAT\par \par Follow UP\par 6weeks

## 2020-04-20 ENCOUNTER — NON-APPOINTMENT (OUTPATIENT)
Age: 25
End: 2020-04-20

## 2020-06-12 ENCOUNTER — APPOINTMENT (OUTPATIENT)
Dept: ORTHOPEDIC SURGERY | Facility: CLINIC | Age: 25
End: 2020-06-12
Payer: OTHER MISCELLANEOUS

## 2020-06-12 VITALS
SYSTOLIC BLOOD PRESSURE: 127 MMHG | DIASTOLIC BLOOD PRESSURE: 88 MMHG | BODY MASS INDEX: 29.62 KG/M2 | HEIGHT: 69 IN | WEIGHT: 200 LBS | TEMPERATURE: 98.4 F | HEART RATE: 79 BPM

## 2020-06-12 PROCEDURE — 73564 X-RAY EXAM KNEE 4 OR MORE: CPT | Mod: LT

## 2020-06-12 PROCEDURE — 99214 OFFICE O/P EST MOD 30 MIN: CPT

## 2020-06-12 RX ORDER — DICLOFENAC SODIUM 50 MG/1
50 TABLET, DELAYED RELEASE ORAL
Qty: 60 | Refills: 1 | Status: ACTIVE | COMMUNITY
Start: 2020-06-12 | End: 1900-01-01

## 2020-06-12 NOTE — PHYSICAL EXAM
[Percentage Of Disability ( ___ % )] : currently ~he/she~ is at [unfilled]% disability [Physical Disability Temporary Total] : temporarily total disabled [de-identified] : Physical Examination\par General: well nourished, in no acute distress, alert and oriented to person, place and time\par Psychiatric: normal mood and affect, no abnormal movements or speech patterns\par Eyes: vision intact - glasses\par Throat: no thyromegaly\par Lymph: no enlarged nodes, no lymphedema in extremity\par Respiratory: no wheezing, no shortness of breath with ambulation\par Cardiac: no cardiac leg swelling, 2+ peripheral pulses\par Neurology: normal gross sensation in extremities to light touch\par Abdomen: soft, non-tender, tympanic, no masses\par \par Musculoskeletal Examination\par Ambulation	+ antalgic gait, - assistive devices\par \par Knee			Right			Left\par General\par      Swelling/Deformity	normal			normal	\par      Skin			normal			healed incisions\par      Erythema		-			-\par      Standing Alignment	neutral			neutral\par      Effusion		none			none\par Range of Motion\par      Hip			full painless ROM		full painless ROM\par      Knee Flexion		130			130\par      Knee Extension	0			0\par Patella\par      J Sign		-			-\par      Quad Medial/Lateral	1/1 1/1\par      Apprehension		-			-\par      Dennis's		-			-\par      Grind Sign		-			-\par      Crepitus		-			-\par Palpation\par      Medial Joint Line	-			-\par      Medial Fem Condyle	-			-\par      Lateral Joint Line	-			-\par      Quad Tendon		-			-\par      Patella Tendon	-			-\par      Medial Patella		-			-\par      Lateral Patella 	-			-\par      Posterior Knee	-			-\par Ligamentous\par      Varus @ 0° / 30°	-/-			-/-\par      Valgus @ 0° / 30°	-/-			-/-\par      Lachman		-			1A\par      Pivot Shift		-			limited exam\par      Anterior Drawer	-			-\par      Posterior Drawer	-			-\par Meniscus\par      Samantha		-			-\par      Flexion Pinch		-			-\par Strength Examination/Atrophy\par      Hip Flexors 		5+			5+\par      Quadriceps		5+			5+\par      Hamstring		5+			5+\par      Tibialis Anterior	5+			5+\par      Achilles/Soleus	5+			5+\par Sensation\par      Deep Peroneal	normal			normal\par      Superficial Peroneal 	normal			normal\par      Sural  		normal			normal\par      Posterior Tibial 	normal			normal\par      Saphneous 		normal			normal\par Pulses\par      DP			2+			2+\par  [de-identified] : 5 views of the affected Left knee (standing AP, flexing standing AP, 30degree flexed lateral, 0degree lateral, sunrise view)\par 8-16-19\par demonstrate:\par There is no narrowing\par no osteophytic lipping\par no suprapatellar effusion\par no patellofemoral joint space loss without evidence of tilt [or] subluxation on sunrise view\par Normal soft tissue density\par Otherwise normal osseous bone structure without fracture or dislocation\par \par \par MRI Left knee from [facility] on 9-13-19\par My impression of the images:\par Quality of the MRI is ok\par Medial Meniscus ok\par Lateral Meniscus possible flipped bucket handle tear\par There is mild chondral loss in the patellofemoral compartments\par There is bone marrow edema in the lateral compartments bone bruise acl pattern\par LCL is intact\par MCL is intact\par ACL is not intact  torn\par PCL is intact \par Quadriceps Tendon  is  intact\par Patella Tendon is intact\par \par The Final Radiologist Impression:\par Joint effusion.\par Lateral patellar tilt, without subluxation. Vaguely defined marrow edema posterior margins medial and lateral tibial plateau, as well as along the anterior margin of the medial tibial plateau. No visualized fracture line. No marrow infiltrative process.\par Full-thickness tear anterior cruciate ligament, with abnormal horizontal orientation. Distal quadriceps tendon, patellar tendon, PCL, popliteus, medial collateral ligament, lateral collateral ligament, iliotibial band and biceps femoris intact.\par No evidence of medial meniscal tear. Truncation lateral meniscal body with a question of a displaced bucket-handle fragment in the intercondylar region versus a prominent meniscal-meniscal ligament.\par No soft tissue mass.\par \par IMPRESSION:\par 1. Full-thickness tear anterior cruciate ligament.\par 2. Lateral meniscal tear with question of nondisplaced intercondylar bucket handle fragment, as above.\par 3. Residual bone contusions.\par 4. Joint effusion.\par \par 5 views of the affected Left knee (standing AP, flexing standing AP, 30degree flexed lateral, 0degree lateral, sunrise view)\par were ordered, obtained and evaluated by myself today and\par demonstrate:\par There is no narrowing\par no osteophytic lipping\par no suprapatellar effusion\par no patellofemoral joint space loss without evidence of tilt [or] subluxation on sunrise view\par Normal soft tissue density\par bony tunnels in appropriate locations\par button and screw in appropriate position\par  [FreeTextEntry3] : out of work [FreeTextEntry1] : trial return to work july 15 2020

## 2020-06-12 NOTE — DISCUSSION/SUMMARY
[de-identified] : 6 mo status post Left knee Arthroscopy on 1-2-2020\par ACL reconstruction with hamstring autograft with allograft augmentation\par Lateral meniscus debridement and repair with outside in and all inside repair\par \par Continue w Physical Therapy with Prescription and Protocol Provided\par Return to activities gradually as tolerated\par \par WBAT\par \par return to work trial\par \par Follow UP

## 2020-07-10 ENCOUNTER — APPOINTMENT (OUTPATIENT)
Dept: ORTHOPEDIC SURGERY | Facility: CLINIC | Age: 25
End: 2020-07-10
Payer: OTHER MISCELLANEOUS

## 2020-07-10 VITALS
SYSTOLIC BLOOD PRESSURE: 126 MMHG | HEART RATE: 69 BPM | HEIGHT: 70 IN | WEIGHT: 210 LBS | DIASTOLIC BLOOD PRESSURE: 84 MMHG | BODY MASS INDEX: 30.06 KG/M2

## 2020-07-10 PROCEDURE — 99213 OFFICE O/P EST LOW 20 MIN: CPT

## 2020-07-10 NOTE — HISTORY OF PRESENT ILLNESS
[de-identified] : Patient is status post Left knee Arthroscopy on 1-2-2020\par ACL reconstruction with hamstring autograft with allograft augmentation\par Lateral meniscus debridement and repair with outside in and all inside repair\par \par The patient is doing well with no pain. the operative knee is stable, some stabbing pain occasionally lateral and anterior calf\par \par In home PT now, no instability. needs to return to work\par \par The patient denies shortness of breath, chest pain, numbness tingling, worsening calf pain or swelling.\par

## 2020-07-10 NOTE — PHYSICAL EXAM
[de-identified] : Physical Examination\par General: well nourished, in no acute distress, alert and oriented to person, place and time\par Psychiatric: normal mood and affect, no abnormal movements or speech patterns\par Eyes: vision intact - glasses\par Throat: no thyromegaly\par Lymph: no enlarged nodes, no lymphedema in extremity\par Respiratory: no wheezing, no shortness of breath with ambulation\par Cardiac: no cardiac leg swelling, 2+ peripheral pulses\par Neurology: normal gross sensation in extremities to light touch\par Abdomen: soft, non-tender, tympanic, no masses\par \par Musculoskeletal Examination\par Ambulation	+ antalgic gait, - assistive devices\par \par Knee			Right			Left\par General\par      Swelling/Deformity	normal			normal	\par      Skin			normal			healed incisions\par      Erythema		-			-\par      Standing Alignment	neutral			neutral\par      Effusion		none			none\par Range of Motion\par      Hip			full painless ROM		full painless ROM\par      Knee Flexion		130			125\par      Knee Extension	0			0\par Patella\par      J Sign		-			-\par      Quad Medial/Lateral	1/1 1/1\par      Apprehension		-			-\par      Dennis's		-			-\par      Grind Sign		-			-\par      Crepitus		-			-\par Palpation\par      Medial Joint Line	-			-\par      Medial Fem Condyle	-			-\par      Lateral Joint Line	-			-\par      Quad Tendon		-			-\par      Patella Tendon	-			-\par      Medial Patella		-			-\par      Lateral Patella 	-			-\par      Posterior Knee	-			-\par Ligamentous\par      Varus @ 0° / 30°	-/-			-/-\par      Valgus @ 0° / 30°	-/-			-/-\par      Lachman		-			1A\par      Pivot Shift		-			limited exam\par      Anterior Drawer	-			-\par      Posterior Drawer	-			-\par Meniscus\par      Samantha		-			-\par      Flexion Pinch		-			-\par Strength Examination/Atrophy\par      Hip Flexors 		5+			5+\par      Quadriceps		5+			5+\par      Hamstring		5+			5+\par      Tibialis Anterior	5+			5+\par      Achilles/Soleus	5+			5+\par Sensation\par      Deep Peroneal	normal			normal\par      Superficial Peroneal 	normal			normal\par      Sural  		normal			normal\par      Posterior Tibial 	normal			normal\par      Saphneous 		normal			normal\par Pulses\par      DP			2+			2+\par  [de-identified] : 5 views of the affected Left knee (standing AP, flexing standing AP, 30degree flexed lateral, 0degree lateral, sunrise view)\par 8-16-19\par demonstrate:\par There is no narrowing\par no osteophytic lipping\par no suprapatellar effusion\par no patellofemoral joint space loss without evidence of tilt [or] subluxation on sunrise view\par Normal soft tissue density\par Otherwise normal osseous bone structure without fracture or dislocation\par \par \par MRI Left knee from [facility] on 9-13-19\par My impression of the images:\par Quality of the MRI is ok\par Medial Meniscus ok\par Lateral Meniscus possible flipped bucket handle tear\par There is mild chondral loss in the patellofemoral compartments\par There is bone marrow edema in the lateral compartments bone bruise acl pattern\par LCL is intact\par MCL is intact\par ACL is not intact  torn\par PCL is intact \par Quadriceps Tendon  is  intact\par Patella Tendon is intact\par \par The Final Radiologist Impression:\par Joint effusion.\par Lateral patellar tilt, without subluxation. Vaguely defined marrow edema posterior margins medial and lateral tibial plateau, as well as along the anterior margin of the medial tibial plateau. No visualized fracture line. No marrow infiltrative process.\par Full-thickness tear anterior cruciate ligament, with abnormal horizontal orientation. Distal quadriceps tendon, patellar tendon, PCL, popliteus, medial collateral ligament, lateral collateral ligament, iliotibial band and biceps femoris intact.\par No evidence of medial meniscal tear. Truncation lateral meniscal body with a question of a displaced bucket-handle fragment in the intercondylar region versus a prominent meniscal-meniscal ligament.\par No soft tissue mass.\par \par IMPRESSION:\par 1. Full-thickness tear anterior cruciate ligament.\par 2. Lateral meniscal tear with question of nondisplaced intercondylar bucket handle fragment, as above.\par 3. Residual bone contusions.\par 4. Joint effusion.\par \par 5 views of the affected Left knee (standing AP, flexing standing AP, 30degree flexed lateral, 0degree lateral, sunrise view)\par 6-\par demonstrate:\par There is no narrowing\par no osteophytic lipping\par no suprapatellar effusion\par no patellofemoral joint space loss without evidence of tilt [or] subluxation on sunrise view\par Normal soft tissue density\par bony tunnels in appropriate locations\par button and screw in appropriate position\par  [Physical Disability Temporary Total] : temporarily total disabled [FreeTextEntry1] : return to work trial full duty 7- [FreeTextEntry3] : out of work

## 2020-07-10 NOTE — DISCUSSION/SUMMARY
[de-identified] : 7 mo status post Left knee Arthroscopy on 1-2-2020\par ACL reconstruction with hamstring autograft with allograft augmentation\par Lateral meniscus debridement and repair with outside in and all inside repair\par \par Continue w Physical Therapy with Prescription and Protocol Provided\par Return to activities gradually as tolerated\par \par WBAT\par \par return to work trial full duty 7-\par \par Follow UP\par 2 months

## 2020-07-17 ENCOUNTER — APPOINTMENT (OUTPATIENT)
Dept: ORTHOPEDIC SURGERY | Facility: CLINIC | Age: 25
End: 2020-07-17
Payer: OTHER MISCELLANEOUS

## 2020-07-17 VITALS — TEMPERATURE: 97.3 F

## 2020-07-17 VITALS
HEART RATE: 76 BPM | SYSTOLIC BLOOD PRESSURE: 127 MMHG | DIASTOLIC BLOOD PRESSURE: 84 MMHG | BODY MASS INDEX: 30.21 KG/M2 | WEIGHT: 211 LBS | HEIGHT: 70 IN

## 2020-07-17 PROCEDURE — 99213 OFFICE O/P EST LOW 20 MIN: CPT

## 2020-07-17 NOTE — HISTORY OF PRESENT ILLNESS
[de-identified] : Patient is status post Left knee Arthroscopy on 1-2-2020\par ACL reconstruction with hamstring autograft with allograft augmentation\par Lateral meniscus debridement and repair with outside in and all inside repair\par \par The patient is in more pain post return to work w prolonged kneeling on affected knee and worsening patellofemoral pain, clicking and instability\par \par The patient denies shortness of breath, chest pain, numbness tingling, worsening calf pain or swelling.\par

## 2020-07-17 NOTE — PHYSICAL EXAM
[de-identified] : Physical Examination\par General: well nourished, in no acute distress, alert and oriented to person, place and time\par Psychiatric: normal mood and affect, no abnormal movements or speech patterns\par Eyes: vision intact - glasses\par Throat: no thyromegaly\par Lymph: no enlarged nodes, no lymphedema in extremity\par Respiratory: no wheezing, no shortness of breath with ambulation\par Cardiac: no cardiac leg swelling, 2+ peripheral pulses\par Neurology: normal gross sensation in extremities to light touch\par Abdomen: soft, non-tender, tympanic, no masses\par \par Musculoskeletal Examination\par Ambulation	+ antalgic gait, - assistive devices\par \par Knee			Right			Left\par General\par      Swelling/Deformity	normal			normal	\par      Skin			normal			healed incisions\par      Erythema		-			-\par      Standing Alignment	neutral			neutral\par      Effusion		none			none\par Range of Motion\par      Hip			full painless ROM		full painless ROM\par      Knee Flexion		130			125\par      Knee Extension	0			0\par Patella\par      J Sign		-			-\par      Quad Medial/Lateral	1/1 1/1\par      Apprehension		-			-\par      Dennis's		-			-\par      Grind Sign		-			-\par      Crepitus		-			-\par Palpation\par      Medial Joint Line	-			-\par      Medial Fem Condyle	-			-\par      Lateral Joint Line	-			-\par      Quad Tendon		-			-\par      Patella Tendon	-			-\par      Medial Patella		-			-\par      Lateral Patella 	-			-\par      Posterior Knee	-			-\par Ligamentous\par      Varus @ 0° / 30°	-/-			-/-\par      Valgus @ 0° / 30°	-/-			-/-\par      Lachman		-			1/2 A\par      Pivot Shift		-			limited exam\par      Anterior Drawer	-			-\par      Posterior Drawer	-			-\par Meniscus\par      Samantha		-			-\par      Flexion Pinch		-			-\par Strength Examination/Atrophy\par      Hip Flexors 		5+			5+\par      Quadriceps		5+			5+\par      Hamstring		5+			5+\par      Tibialis Anterior	5+			5+\par      Achilles/Soleus	5+			5+\par Sensation\par      Deep Peroneal	normal			normal\par      Superficial Peroneal 	normal			normal\par      Sural  		normal			normal\par      Posterior Tibial 	normal			normal\par      Saphneous 		normal			normal\par Pulses\par      DP			2+			2+\par  [de-identified] : 5 views of the affected Left knee (standing AP, flexing standing AP, 30degree flexed lateral, 0degree lateral, sunrise view)\par 8-16-19\par demonstrate:\par There is no narrowing\par no osteophytic lipping\par no suprapatellar effusion\par no patellofemoral joint space loss without evidence of tilt [or] subluxation on sunrise view\par Normal soft tissue density\par Otherwise normal osseous bone structure without fracture or dislocation\par \par \par MRI Left knee from [facility] on 9-13-19\par My impression of the images:\par Quality of the MRI is ok\par Medial Meniscus ok\par Lateral Meniscus possible flipped bucket handle tear\par There is mild chondral loss in the patellofemoral compartments\par There is bone marrow edema in the lateral compartments bone bruise acl pattern\par LCL is intact\par MCL is intact\par ACL is not intact  torn\par PCL is intact \par Quadriceps Tendon  is  intact\par Patella Tendon is intact\par \par The Final Radiologist Impression:\par Joint effusion.\par Lateral patellar tilt, without subluxation. Vaguely defined marrow edema posterior margins medial and lateral tibial plateau, as well as along the anterior margin of the medial tibial plateau. No visualized fracture line. No marrow infiltrative process.\par Full-thickness tear anterior cruciate ligament, with abnormal horizontal orientation. Distal quadriceps tendon, patellar tendon, PCL, popliteus, medial collateral ligament, lateral collateral ligament, iliotibial band and biceps femoris intact.\par No evidence of medial meniscal tear. Truncation lateral meniscal body with a question of a displaced bucket-handle fragment in the intercondylar region versus a prominent meniscal-meniscal ligament.\par No soft tissue mass.\par \par IMPRESSION:\par 1. Full-thickness tear anterior cruciate ligament.\par 2. Lateral meniscal tear with question of nondisplaced intercondylar bucket handle fragment, as above.\par 3. Residual bone contusions.\par 4. Joint effusion.\par \par 5 views of the affected Left knee (standing AP, flexing standing AP, 30degree flexed lateral, 0degree lateral, sunrise view)\par 6-\par demonstrate:\par There is no narrowing\par no osteophytic lipping\par no suprapatellar effusion\par no patellofemoral joint space loss without evidence of tilt [or] subluxation on sunrise view\par Normal soft tissue density\par bony tunnels in appropriate locations\par button and screw in appropriate position\par  [Percentage Of Disability ( ___ % )] : currently ~he/she~ is at [unfilled]% disability [Not Fit For Work] : Not fit for work [FreeTextEntry1] : 2 weeks [Physical Disability Temporary Total] : temporarily total disabled

## 2020-07-17 NOTE — DISCUSSION/SUMMARY
[de-identified] : 7 mo status post Left knee Arthroscopy on 1-2-2020\par ACL reconstruction with hamstring autograft with allograft augmentation\par Lateral meniscus debridement and repair with outside in and all inside repair\par \par Continue w Physical Therapy with Prescription and Protocol Provided\par Return to activities gradually as tolerated\par \par WBAT\par \par hold return to work 2 weeks. more PT. recommend light duty no squat or knee.\par \par Follow UP\par 2 weeks

## 2020-07-19 ENCOUNTER — FORM ENCOUNTER (OUTPATIENT)
Age: 25
End: 2020-07-19

## 2020-07-28 ENCOUNTER — APPOINTMENT (OUTPATIENT)
Dept: ORTHOPEDIC SURGERY | Facility: CLINIC | Age: 25
End: 2020-07-28

## 2020-07-28 ENCOUNTER — APPOINTMENT (OUTPATIENT)
Dept: ORTHOPEDIC SURGERY | Facility: CLINIC | Age: 25
End: 2020-07-28
Payer: OTHER MISCELLANEOUS

## 2020-07-28 VITALS
BODY MASS INDEX: 30.21 KG/M2 | WEIGHT: 211 LBS | HEART RATE: 81 BPM | DIASTOLIC BLOOD PRESSURE: 87 MMHG | SYSTOLIC BLOOD PRESSURE: 126 MMHG | HEIGHT: 70 IN

## 2020-07-28 PROCEDURE — 99213 OFFICE O/P EST LOW 20 MIN: CPT

## 2020-07-28 NOTE — DISCUSSION/SUMMARY
[de-identified] : 7 mo status post Left knee Arthroscopy on 1-2-2020\par ACL reconstruction with hamstring autograft with allograft augmentation\par Lateral meniscus debridement and repair with outside in and all inside repair\par \par Continue w Physical Therapy with Prescription and Protocol Provided\par Return to activities gradually as tolerated\par \par WBAT\par \par hold return to work 6 weeks. more PT. recommend light duty no squat or knee - reports no light duty available\par \par Follow UP\par 6 weeks

## 2020-07-28 NOTE — HISTORY OF PRESENT ILLNESS
[de-identified] : Patient is status post Left knee Arthroscopy on 1-2-2020\par ACL reconstruction with hamstring autograft with allograft augmentation\par Lateral meniscus debridement and repair with outside in and all inside repair\par \par The patient is in more pain post return to work w prolonged kneeling on affected knee and worsening patellofemoral pain, clicking and instability\par Has proper paperwork today\par \par The patient denies shortness of breath, chest pain, numbness tingling, worsening calf pain or swelling.\par

## 2020-07-28 NOTE — PHYSICAL EXAM
[de-identified] : Physical Examination\par General: well nourished, in no acute distress, alert and oriented to person, place and time\par Psychiatric: normal mood and affect, no abnormal movements or speech patterns\par Eyes: vision intact - glasses\par Throat: no thyromegaly\par Lymph: no enlarged nodes, no lymphedema in extremity\par Respiratory: no wheezing, no shortness of breath with ambulation\par Cardiac: no cardiac leg swelling, 2+ peripheral pulses\par Neurology: normal gross sensation in extremities to light touch\par Abdomen: soft, non-tender, tympanic, no masses\par \par Musculoskeletal Examination\par Ambulation	+ antalgic gait, - assistive devices\par \par Knee			Right			Left\par General\par      Swelling/Deformity	normal			normal	\par      Skin			normal			healed incisions\par      Erythema		-			-\par      Standing Alignment	neutral			neutral\par      Effusion		none			none\par Range of Motion\par      Hip			full painless ROM		full painless ROM\par      Knee Flexion		130			125\par      Knee Extension	0			0\par Patella\par      J Sign		-			-\par      Quad Medial/Lateral	1/1 1/1\par      Apprehension		-			-\par      Dennis's		-			-\par      Grind Sign		-			-\par      Crepitus		-			-\par Palpation\par      Medial Joint Line	-			-\par      Medial Fem Condyle	-			-\par      Lateral Joint Line	-			-\par      Quad Tendon		-			-\par      Patella Tendon	-			-\par      Medial Patella		-			-\par      Lateral Patella 	-			-\par      Posterior Knee	-			-\par Ligamentous\par      Varus @ 0° / 30°	-/-			-/-\par      Valgus @ 0° / 30°	-/-			-/-\par      Lachman		-			1/2 A\par      Pivot Shift		-			limited exam\par      Anterior Drawer	-			-\par      Posterior Drawer	-			-\par Meniscus\par      Samantha		-			-\par      Flexion Pinch		-			-\par Strength Examination/Atrophy\par      Hip Flexors 		5+			5+\par      Quadriceps		5+			5+\par      Hamstring		5+			5+\par      Tibialis Anterior	5+			5+\par      Achilles/Soleus	5+			5+\par Sensation\par      Deep Peroneal	normal			normal\par      Superficial Peroneal 	normal			normal\par      Sural  		normal			normal\par      Posterior Tibial 	normal			normal\par      Saphneous 		normal			normal\par Pulses\par      DP			2+			2+\par  [de-identified] : 5 views of the affected Left knee (standing AP, flexing standing AP, 30degree flexed lateral, 0degree lateral, sunrise view)\par 8-16-19\par demonstrate:\par There is no narrowing\par no osteophytic lipping\par no suprapatellar effusion\par no patellofemoral joint space loss without evidence of tilt [or] subluxation on sunrise view\par Normal soft tissue density\par Otherwise normal osseous bone structure without fracture or dislocation\par \par \par MRI Left knee from [facility] on 9-13-19\par My impression of the images:\par Quality of the MRI is ok\par Medial Meniscus ok\par Lateral Meniscus possible flipped bucket handle tear\par There is mild chondral loss in the patellofemoral compartments\par There is bone marrow edema in the lateral compartments bone bruise acl pattern\par LCL is intact\par MCL is intact\par ACL is not intact  torn\par PCL is intact \par Quadriceps Tendon  is  intact\par Patella Tendon is intact\par \par The Final Radiologist Impression:\par Joint effusion.\par Lateral patellar tilt, without subluxation. Vaguely defined marrow edema posterior margins medial and lateral tibial plateau, as well as along the anterior margin of the medial tibial plateau. No visualized fracture line. No marrow infiltrative process.\par Full-thickness tear anterior cruciate ligament, with abnormal horizontal orientation. Distal quadriceps tendon, patellar tendon, PCL, popliteus, medial collateral ligament, lateral collateral ligament, iliotibial band and biceps femoris intact.\par No evidence of medial meniscal tear. Truncation lateral meniscal body with a question of a displaced bucket-handle fragment in the intercondylar region versus a prominent meniscal-meniscal ligament.\par No soft tissue mass.\par \par IMPRESSION:\par 1. Full-thickness tear anterior cruciate ligament.\par 2. Lateral meniscal tear with question of nondisplaced intercondylar bucket handle fragment, as above.\par 3. Residual bone contusions.\par 4. Joint effusion.\par \par 5 views of the affected Left knee (standing AP, flexing standing AP, 30degree flexed lateral, 0degree lateral, sunrise view)\par 6-\par demonstrate:\par There is no narrowing\par no osteophytic lipping\par no suprapatellar effusion\par no patellofemoral joint space loss without evidence of tilt [or] subluxation on sunrise view\par Normal soft tissue density\par bony tunnels in appropriate locations\par button and screw in appropriate position\par  [Not Fit For Work] : Not fit for work [Percentage Of Disability ( ___ % )] : currently ~he/she~ is at [unfilled]% disability [FreeTextEntry1] : Return Sept 14th 2020 [Physical Disability Temporary Total] : temporarily total disabled

## 2020-09-15 ENCOUNTER — APPOINTMENT (OUTPATIENT)
Dept: ORTHOPEDIC SURGERY | Facility: CLINIC | Age: 25
End: 2020-09-15
Payer: OTHER MISCELLANEOUS

## 2020-09-15 VITALS
HEIGHT: 70 IN | BODY MASS INDEX: 30.06 KG/M2 | DIASTOLIC BLOOD PRESSURE: 82 MMHG | SYSTOLIC BLOOD PRESSURE: 135 MMHG | WEIGHT: 210 LBS | HEART RATE: 81 BPM | OXYGEN SATURATION: 98 %

## 2020-09-15 PROCEDURE — 99213 OFFICE O/P EST LOW 20 MIN: CPT

## 2020-09-15 NOTE — HISTORY OF PRESENT ILLNESS
[de-identified] : Patient is status post Left knee Arthroscopy on 1-2-2020\par ACL reconstruction with hamstring autograft with allograft augmentation\par Lateral meniscus debridement and repair with outside in and all inside repair\par \par The patient patellofemoral pain is much better w PT, feels ok to restart work trial\par \par The patient denies shortness of breath, chest pain, numbness tingling, worsening calf pain or swelling.\par

## 2020-09-15 NOTE — PHYSICAL EXAM
[de-identified] : Physical Examination\par General: well nourished, in no acute distress, alert and oriented to person, place and time\par Psychiatric: normal mood and affect, no abnormal movements or speech patterns\par Eyes: vision intact - glasses\par Throat: no thyromegaly\par Lymph: no enlarged nodes, no lymphedema in extremity\par Respiratory: no wheezing, no shortness of breath with ambulation\par Cardiac: no cardiac leg swelling, 2+ peripheral pulses\par Neurology: normal gross sensation in extremities to light touch\par Abdomen: soft, non-tender, tympanic, no masses\par \par Musculoskeletal Examination\par Ambulation	- antalgic gait, - assistive devices\par \par Knee			Right			Left\par General\par      Swelling/Deformity	normal			normal	\par      Skin			normal			healed incisions\par      Erythema		-			-\par      Standing Alignment	neutral			neutral\par      Effusion		none			none\par Range of Motion\par      Hip			full painless ROM		full painless ROM\par      Knee Flexion		130			125\par      Knee Extension	0			0\par Patella\par      J Sign		-			-\par      Quad Medial/Lateral	1/1 1/1\par      Apprehension		-			-\par      Dennis's		-			-\par      Grind Sign		-			-\par      Crepitus		-			-\par Palpation\par      Medial Joint Line	-			-\par      Medial Fem Condyle	-			-\par      Lateral Joint Line	-			-\par      Quad Tendon		-			-\par      Patella Tendon	-			-\par      Medial Patella		-			-\par      Lateral Patella 	-			-\par      Posterior Knee	-			-\par Ligamentous\par      Varus @ 0° / 30°	-/-			-/-\par      Valgus @ 0° / 30°	-/-			-/-\par      Lachman		-			1A\par      Pivot Shift		-			limited exam\par      Anterior Drawer	-			-\par      Posterior Drawer	-			-\par Meniscus\par      Samantha		-			-\par      Flexion Pinch		-			-\par Strength Examination/Atrophy\par      Hip Flexors 		5+			5+\par      Quadriceps		5+			5+\par      Hamstring		5+			5+\par      Tibialis Anterior	5+			5+\par      Achilles/Soleus	5+			5+\par Sensation\par      Deep Peroneal	normal			normal\par      Superficial Peroneal 	normal			normal\par      Sural  		normal			normal\par      Posterior Tibial 	normal			normal\par      Saphneous 		normal			normal\par Pulses\par      DP			2+			2+\par  [de-identified] : 5 views of the affected Left knee (standing AP, flexing standing AP, 30degree flexed lateral, 0degree lateral, sunrise view)\par 8-16-19\par demonstrate:\par There is no narrowing\par no osteophytic lipping\par no suprapatellar effusion\par no patellofemoral joint space loss without evidence of tilt [or] subluxation on sunrise view\par Normal soft tissue density\par Otherwise normal osseous bone structure without fracture or dislocation\par \par \par MRI Left knee [facility] on 9-13-19\par My impression of the images:\par Quality of the MRI is ok\par Medial Meniscus ok\par Lateral Meniscus possible flipped bucket handle tear\par There is mild chondral loss in the patellofemoral compartments\par There is bone marrow edema in the lateral compartments bone bruise acl pattern\par LCL is intact\par MCL is intact\par ACL is not intact  torn\par PCL is intact \par Quadriceps Tendon  is  intact\par Patella Tendon is intact\par \par The Final Radiologist Impression:\par Joint effusion.\par Lateral patellar tilt, without subluxation. Vaguely defined marrow edema posterior margins medial and lateral tibial plateau, as well as along the anterior margin of the medial tibial plateau. No visualized fracture line. No marrow infiltrative process.\par Full-thickness tear anterior cruciate ligament, with abnormal horizontal orientation. Distal quadriceps tendon, patellar tendon, PCL, popliteus, medial collateral ligament, lateral collateral ligament, iliotibial band and biceps femoris intact.\par No evidence of medial meniscal tear. Truncation lateral meniscal body with a question of a displaced bucket-handle fragment in the intercondylar region versus a prominent meniscal-meniscal ligament.\par No soft tissue mass.\par \par IMPRESSION:\par 1. Full-thickness tear anterior cruciate ligament.\par 2. Lateral meniscal tear with question of nondisplaced intercondylar bucket handle fragment, as above.\par 3. Residual bone contusions.\par 4. Joint effusion.\par \par 5 views of the affected Left knee (standing AP, flexing standing AP, 30degree flexed lateral, 0degree lateral, sunrise view)\par 6-\par demonstrate:\par There is no narrowing\par no osteophytic lipping\par no suprapatellar effusion\par no patellofemoral joint space loss without evidence of tilt [or] subluxation on sunrise view\par Normal soft tissue density\par bony tunnels in appropriate locations\par button and screw in appropriate position\par

## 2020-09-15 NOTE — DISCUSSION/SUMMARY
[de-identified] : 7 mo status post Left knee Arthroscopy on 1-2-2020\par ACL reconstruction with hamstring autograft with allograft augmentation\par Lateral meniscus debridement and repair with outside in and all inside repair\par \par Continue w Physical Therapy with Prescription and Protocol Provided\par Return to activities gradually as tolerated\par \par WBAT\par \par trial full duty reports no light duty available\par \par Follow UP\par 6 weeks

## 2020-12-08 ENCOUNTER — APPOINTMENT (OUTPATIENT)
Dept: ORTHOPEDIC SURGERY | Facility: CLINIC | Age: 25
End: 2020-12-08
Payer: SELF-PAY

## 2020-12-08 VITALS
HEIGHT: 70 IN | HEART RATE: 80 BPM | SYSTOLIC BLOOD PRESSURE: 130 MMHG | WEIGHT: 210 LBS | DIASTOLIC BLOOD PRESSURE: 85 MMHG | BODY MASS INDEX: 30.06 KG/M2

## 2020-12-08 PROCEDURE — 99213 OFFICE O/P EST LOW 20 MIN: CPT

## 2020-12-08 NOTE — DISCUSSION/SUMMARY
[de-identified] : 7 mo status post Left knee Arthroscopy on 1-2-2020\par ACL reconstruction with hamstring autograft with allograft augmentation\par Lateral meniscus debridement and repair with outside in and all inside repair\par \par Continue w Physical Therapy with Prescription and Protocol Provided\par Return to activities gradually as tolerated\par \par WBAT\par \par trial full duty reports no light duty available\par \par Follow UP\par 6 weeks

## 2020-12-08 NOTE — PHYSICAL EXAM
[de-identified] : Physical Examination\par General: well nourished, in no acute distress, alert and oriented to person, place and time\par Psychiatric: normal mood and affect, no abnormal movements or speech patterns\par Eyes: vision intact - glasses\par Throat: no thyromegaly\par Lymph: no enlarged nodes, no lymphedema in extremity\par Respiratory: no wheezing, no shortness of breath with ambulation\par Cardiac: no cardiac leg swelling, 2+ peripheral pulses\par Neurology: normal gross sensation in extremities to light touch\par Abdomen: soft, non-tender, tympanic, no masses\par \par Musculoskeletal Examination\par Ambulation	- antalgic gait, - assistive devices\par \par Knee			Right			Left\par General\par      Swelling/Deformity	normal			normal	\par      Skin			normal			healed incisions\par      Erythema		-			-\par      Standing Alignment	neutral			neutral\par      Effusion		none			none\par Range of Motion\par      Hip			full painless ROM		full painless ROM\par      Knee Flexion		130			125\par      Knee Extension	0			0\par Patella\par      J Sign		-			-\par      Quad Medial/Lateral	1/1 1/1\par      Apprehension		-			-\par      Dennis's		-			-\par      Grind Sign		-			-\par      Crepitus		-			-\par Palpation\par      Medial Joint Line	-			-\par      Medial Fem Condyle	-			-\par      Lateral Joint Line	-			-\par      Quad Tendon		-			-\par      Patella Tendon	-			-\par      Medial Patella		-			-\par      Lateral Patella 	-			-\par      Posterior Knee	-			-\par Ligamentous\par      Varus @ 0° / 30°	-/-			-/-\par      Valgus @ 0° / 30°	-/-			-/-\par      Lachman		-			1A\par      Pivot Shift		-			limited exam\par      Anterior Drawer	-			-\par      Posterior Drawer	-			-\par Meniscus\par      Samantha		-			-\par      Flexion Pinch		-			-\par Strength Examination/Atrophy\par      Hip Flexors 		5+			5+\par      Quadriceps		5+			5+\par      Hamstring		5+			5+\par      Tibialis Anterior	5+			5+\par      Achilles/Soleus	5+			5+\par Sensation\par      Deep Peroneal	normal			normal\par      Superficial Peroneal 	normal			normal\par      Sural  		normal			normal\par      Posterior Tibial 	normal			normal\par      Saphneous 		normal			normal\par Pulses\par      DP			2+			2+\par  [de-identified] : 5 views of the affected Left knee (standing AP, flexing standing AP, 30degree flexed lateral, 0degree lateral, sunrise view)\par 8-16-19\par demonstrate:\par There is no narrowing\par no osteophytic lipping\par no suprapatellar effusion\par no patellofemoral joint space loss without evidence of tilt [or] subluxation on sunrise view\par Normal soft tissue density\par Otherwise normal osseous bone structure without fracture or dislocation\par \par \par MRI Left knee from [facility] on 9-13-19\par My impression of the images:\par Quality of the MRI is ok\par Medial Meniscus ok\par Lateral Meniscus possible flipped bucket handle tear\par There is mild chondral loss in the patellofemoral compartments\par There is bone marrow edema in the lateral compartments bone bruise acl pattern\par LCL is intact\par MCL is intact\par ACL is not intact  torn\par PCL is intact \par Quadriceps Tendon  is  intact\par Patella Tendon is intact\par \par The Final Radiologist Impression:\par Joint effusion.\par Lateral patellar tilt, without subluxation. Vaguely defined marrow edema posterior margins medial and lateral tibial plateau, as well as along the anterior margin of the medial tibial plateau. No visualized fracture line. No marrow infiltrative process.\par Full-thickness tear anterior cruciate ligament, with abnormal horizontal orientation. Distal quadriceps tendon, patellar tendon, PCL, popliteus, medial collateral ligament, lateral collateral ligament, iliotibial band and biceps femoris intact.\par No evidence of medial meniscal tear. Truncation lateral meniscal body with a question of a displaced bucket-handle fragment in the intercondylar region versus a prominent meniscal-meniscal ligament.\par No soft tissue mass.\par \par IMPRESSION:\par 1. Full-thickness tear anterior cruciate ligament.\par 2. Lateral meniscal tear with question of nondisplaced intercondylar bucket handle fragment, as above.\par 3. Residual bone contusions.\par 4. Joint effusion.\par \par 5 views of the affected Left knee (standing AP, flexing standing AP, 30degree flexed lateral, 0degree lateral, sunrise view)\par 6-\par demonstrate:\par There is no narrowing\par no osteophytic lipping\par no suprapatellar effusion\par no patellofemoral joint space loss without evidence of tilt [or] subluxation on sunrise view\par Normal soft tissue density\par bony tunnels in appropriate locations\par button and screw in appropriate position\par  [Percentage Of Disability ( ___ % )] : currently ~he/she~ is at [unfilled]% disability [FreeTextEntry3] : return to work full duty trial

## 2021-08-24 ENCOUNTER — APPOINTMENT (OUTPATIENT)
Dept: ORTHOPEDIC SURGERY | Facility: CLINIC | Age: 26
End: 2021-08-24
Payer: OTHER MISCELLANEOUS

## 2021-08-24 VITALS
BODY MASS INDEX: 30.06 KG/M2 | DIASTOLIC BLOOD PRESSURE: 82 MMHG | WEIGHT: 210 LBS | HEIGHT: 70 IN | HEART RATE: 85 BPM | SYSTOLIC BLOOD PRESSURE: 132 MMHG

## 2021-08-24 DIAGNOSIS — S83.512A SPRAIN OF ANTERIOR CRUCIATE LIGAMENT OF LEFT KNEE, INITIAL ENCOUNTER: ICD-10-CM

## 2021-08-24 DIAGNOSIS — S83.252A BUCKET-HANDLE TEAR OF LATERAL MENISCUS, CURRENT INJURY, LEFT KNEE, INITIAL ENCOUNTER: ICD-10-CM

## 2021-08-24 DIAGNOSIS — M22.2X2 PATELLOFEMORAL DISORDERS, LEFT KNEE: ICD-10-CM

## 2021-08-24 PROCEDURE — 99072 ADDL SUPL MATRL&STAF TM PHE: CPT

## 2021-08-24 PROCEDURE — 99213 OFFICE O/P EST LOW 20 MIN: CPT

## 2021-08-24 PROCEDURE — 73564 X-RAY EXAM KNEE 4 OR MORE: CPT | Mod: LT

## 2021-08-24 NOTE — DISCUSSION/SUMMARY
[de-identified] : 18 mo status post Left knee Arthroscopy on 1-2-2020\par ACL reconstruction with hamstring autograft with allograft augmentation\par Lateral meniscus debridement and repair with outside in and all inside repair\par \par work on quad strengthning\par \par WBAT\par \par continue full duty\par \par Follow UP\par 3 mo

## 2021-08-24 NOTE — PHYSICAL EXAM
[Percentage Of Disability ( ___ % )] : currently ~he/she~ is at [unfilled]% disability [de-identified] : Physical Examination\par General: well nourished, in no acute distress, alert and oriented to person, place and time\par Psychiatric: normal mood and affect, no abnormal movements or speech patterns\par Eyes: vision intact - glasses\par Throat: no thyromegaly\par Lymph: no enlarged nodes, no lymphedema in extremity\par Respiratory: no wheezing, no shortness of breath with ambulation\par Cardiac: no cardiac leg swelling, 2+ peripheral pulses\par Neurology: normal gross sensation in extremities to light touch\par Abdomen: soft, non-tender, tympanic, no masses\par \par Musculoskeletal Examination\par Ambulation	- antalgic gait, - assistive devices\par \par Knee			Right			Left\par General\par      Swelling/Deformity	normal			normal	\par      Skin			normal			healed incisions\par      Erythema		-			-\par      Standing Alignment	neutral			neutral\par      Effusion		none			none\par Range of Motion\par      Hip			full painless ROM		full painless ROM\par      Knee Flexion		130			130\par      Knee Extension	0			0\par Patella\par      J Sign		-			-\par      Quad Medial/Lateral	1/1 1/1\par      Apprehension		-			-\par      Dennis's		-			+\par      Grind Sign		-			+\par      Crepitus		-			+\par Palpation\par      Medial Joint Line	-			-\par      Medial Fem Condyle	-			-\par      Lateral Joint Line	-			-\par      Quad Tendon		-			-\par      Patella Tendon	-			-\par      Medial Patella		-			-\par      Lateral Patella 	-			-\par      Posterior Knee	-			-\par Ligamentous\par      Varus @ 0° / 30°	-/-			-/-\par      Valgus @ 0° / 30°	-/-			-/-\par      Lachman		-			slight laxity, firm endpoint\par      Pivot Shift		-			limited exam\par      Anterior Drawer	-			-\par      Posterior Drawer	-			-\par Meniscus\par      Samantha		-			-\par      Flexion Pinch		-			-\par Strength Examination/Atrophy\par      Hip Flexors 		5+			5+\par      Quadriceps		5+			5+ mild atrophy\par decreased single leg toe tap on left\par      Hamstring		5+			5+\par      Tibialis Anterior	5+			5+\par      Achilles/Soleus	5+			5+\par Sensation\par      Deep Peroneal	normal			normal\par      Superficial Peroneal 	normal			normal\par      Sural  		normal			normal\par      Posterior Tibial 	normal			normal\par      Saphneous 		normal			normal\par Pulses\par      DP			2+			2+\par  [de-identified] : 5 views of the affected Left knee (standing AP, flexing standing AP, 30degree flexed lateral, 0degree lateral, sunrise view)\par 8-16-19\par demonstrate:\par There is no narrowing\par no osteophytic lipping\par no suprapatellar effusion\par no patellofemoral joint space loss without evidence of tilt [or] subluxation on sunrise view\par Normal soft tissue density\par Otherwise normal osseous bone structure without fracture or dislocation\par \par \par MRI Left knee from [facility] on 9-13-19\par My impression of the images:\par Quality of the MRI is ok\par Medial Meniscus ok\par Lateral Meniscus possible flipped bucket handle tear\par There is mild chondral loss in the patellofemoral compartments\par There is bone marrow edema in the lateral compartments bone bruise acl pattern\par LCL is intact\par MCL is intact\par ACL is not intact  torn\par PCL is intact \par Quadriceps Tendon  is  intact\par Patella Tendon is intact\par \par The Final Radiologist Impression:\par Joint effusion.\par Lateral patellar tilt, without subluxation. Vaguely defined marrow edema posterior margins medial and lateral tibial plateau, as well as along the anterior margin of the medial tibial plateau. No visualized fracture line. No marrow infiltrative process.\par Full-thickness tear anterior cruciate ligament, with abnormal horizontal orientation. Distal quadriceps tendon, patellar tendon, PCL, popliteus, medial collateral ligament, lateral collateral ligament, iliotibial band and biceps femoris intact.\par No evidence of medial meniscal tear. Truncation lateral meniscal body with a question of a displaced bucket-handle fragment in the intercondylar region versus a prominent meniscal-meniscal ligament.\par No soft tissue mass.\par \par IMPRESSION:\par 1. Full-thickness tear anterior cruciate ligament.\par 2. Lateral meniscal tear with question of nondisplaced intercondylar bucket handle fragment, as above.\par 3. Residual bone contusions.\par 4. Joint effusion.\par \par 5 views of the affected Left knee (standing AP, flexing standing AP, 30degree flexed lateral, 0degree lateral, sunrise view)\par 6-\par demonstrate:\par There is no narrowing\par no osteophytic lipping\par no suprapatellar effusion\par no patellofemoral joint space loss without evidence of tilt [or] subluxation on sunrise view\par Normal soft tissue density\par bony tunnels in appropriate locations\par button and screw in appropriate position\par \par 4 views of the affected Left knee (standing AP, flexing standing AP, 30degree flexed lateral, sunrise view)\par were ordered, obtained and evaluated by myself today and\par demonstrate:\par There is no narrowing\par no osteophytic lipping\par no suprapatellar effusion\par no patellofemoral joint space loss without evidence of tilt [or] subluxation on sunrise view\par Normal soft tissue density\par bony tunnels in appropriate locations\par button and screw in appropriate position\par \par \par  [FreeTextEntry3] : work full duty

## 2021-08-24 NOTE — HISTORY OF PRESENT ILLNESS
[de-identified] : Patient is status post Left knee Arthroscopy on 1-2-2020\par ACL reconstruction with hamstring autograft with allograft augmentation\par Lateral meniscus debridement and repair with outside in and all inside repair\par \par The patient back at work full duty. reports medial and anterior and some posterior pain morning after long day.\par some instability symptoms at end of day\par \par The patient denies shortness of breath, chest pain, numbness tingling, worsening calf pain or swelling.\par

## 2022-09-15 NOTE — H&P PST ADULT - NSANTHBPHIGHRD_ENT_A_CORE
No

## 2023-06-15 NOTE — HISTORY OF PRESENT ILLNESS
[de-identified] : Patient is status post Left knee Arthroscopy on 1-2-2020\par ACL reconstruction with hamstring autograft with allograft augmentation\par Lateral meniscus debridement and repair with outside in and all inside repair\par \par The patient is doing well with no pain. the operative knee is feeling better than the contralateral knee\par \par In home PT now, no instability. needs to return to work\par \par The patient denies shortness of breath, chest pain, numbness tingling, worsening calf pain or swelling.\par  no hematuria/no renal colic/no flank pain L/no flank pain R

## 2024-05-16 NOTE — ED PROVIDER NOTE - CPE EDP NEURO NORM
PT REQUESTING REFILL VENTOLIN INHALER GABAPENTIN 600 TWICE A DAY SENT TO [facility]    normal...

## 2024-07-28 ENCOUNTER — NON-APPOINTMENT (OUTPATIENT)
Age: 29
End: 2024-07-28

## 2024-08-01 ENCOUNTER — APPOINTMENT (OUTPATIENT)
Dept: ORTHOPEDIC SURGERY | Facility: CLINIC | Age: 29
End: 2024-08-01

## 2024-08-01 DIAGNOSIS — M54.50 LOW BACK PAIN, UNSPECIFIED: ICD-10-CM

## 2024-08-13 ENCOUNTER — APPOINTMENT (OUTPATIENT)
Dept: ORTHOPEDIC SURGERY | Facility: CLINIC | Age: 29
End: 2024-08-13

## 2025-07-14 NOTE — HISTORY OF PRESENT ILLNESS
[de-identified] : Patient is status post Left knee Arthroscopy on 1-2-2020\par ACL reconstruction with hamstring autograft with allograft augmentation\par Lateral meniscus debridement and repair with outside in and all inside repair\par \par The patient patellofemoral pain is much better w PT, feels ok to restart work trial\par \par The patient denies shortness of breath, chest pain, numbness tingling, worsening calf pain or swelling.\par  [FreeTextEntry1] : Surveillance